# Patient Record
Sex: FEMALE | Race: WHITE
[De-identification: names, ages, dates, MRNs, and addresses within clinical notes are randomized per-mention and may not be internally consistent; named-entity substitution may affect disease eponyms.]

---

## 2020-07-06 ENCOUNTER — HOSPITAL ENCOUNTER (OUTPATIENT)
Dept: HOSPITAL 96 - M.ULTRA | Age: 60
End: 2020-07-06
Attending: SURGERY
Payer: COMMERCIAL

## 2020-07-06 DIAGNOSIS — R92.1: Primary | ICD-10-CM

## 2020-07-10 ENCOUNTER — HOSPITAL ENCOUNTER (OUTPATIENT)
Dept: HOSPITAL 96 - M.RAD | Age: 60
Discharge: HOME | End: 2020-07-10
Attending: SURGERY
Payer: COMMERCIAL

## 2020-07-10 DIAGNOSIS — R92.1: Primary | ICD-10-CM

## 2020-07-10 DIAGNOSIS — Z79.899: ICD-10-CM

## 2020-07-27 ENCOUNTER — HOSPITAL ENCOUNTER (OUTPATIENT)
Dept: HOSPITAL 96 - M.LAB | Age: 60
End: 2020-07-27
Attending: SURGERY
Payer: COMMERCIAL

## 2020-07-27 DIAGNOSIS — D05.01: Primary | ICD-10-CM

## 2020-07-27 DIAGNOSIS — D05.11: ICD-10-CM

## 2020-07-27 LAB
BUN SERPL-MCNC: 8 MG/DL (ref 7–18)
CREAT SERPL-MCNC: 0.9 MG/DL (ref 0.6–1.3)

## 2020-08-04 ENCOUNTER — HOSPITAL ENCOUNTER (OUTPATIENT)
Dept: HOSPITAL 96 - M.LAB | Age: 60
End: 2020-08-04
Attending: SURGERY
Payer: COMMERCIAL

## 2020-08-04 DIAGNOSIS — D05.11: ICD-10-CM

## 2020-08-04 DIAGNOSIS — D05.01: Primary | ICD-10-CM

## 2020-08-07 ENCOUNTER — HOSPITAL ENCOUNTER (OUTPATIENT)
Dept: HOSPITAL 96 - M.ORTHSURG | Age: 60
Setting detail: OBSERVATION
LOS: 1 days | Discharge: HOME HEALTH | End: 2020-08-08
Attending: SURGERY | Admitting: SURGERY
Payer: COMMERCIAL

## 2020-08-07 VITALS — SYSTOLIC BLOOD PRESSURE: 113 MMHG | DIASTOLIC BLOOD PRESSURE: 66 MMHG

## 2020-08-07 VITALS — SYSTOLIC BLOOD PRESSURE: 114 MMHG | DIASTOLIC BLOOD PRESSURE: 56 MMHG

## 2020-08-07 VITALS — SYSTOLIC BLOOD PRESSURE: 123 MMHG | DIASTOLIC BLOOD PRESSURE: 54 MMHG

## 2020-08-07 VITALS — HEIGHT: 65 IN | WEIGHT: 192 LBS | BODY MASS INDEX: 31.99 KG/M2

## 2020-08-07 DIAGNOSIS — Z79.899: ICD-10-CM

## 2020-08-07 DIAGNOSIS — F10.239: ICD-10-CM

## 2020-08-07 DIAGNOSIS — N39.0: ICD-10-CM

## 2020-08-07 DIAGNOSIS — D05.11: Primary | ICD-10-CM

## 2020-08-07 DIAGNOSIS — B96.20: ICD-10-CM

## 2020-08-07 DIAGNOSIS — I42.9: ICD-10-CM

## 2020-08-07 LAB
ANION GAP SERPL CALC-SCNC: 10 MMOL/L (ref 7–16)
BUN SERPL-MCNC: 10 MG/DL (ref 7–18)
CALCIUM SERPL-MCNC: 8.7 MG/DL (ref 8.5–10.1)
CHLORIDE SERPL-SCNC: 104 MMOL/L (ref 98–107)
CO2 SERPL-SCNC: 27 MMOL/L (ref 21–32)
CREAT SERPL-MCNC: 0.8 MG/DL (ref 0.6–1.3)
GLUCOSE SERPL-MCNC: 108 MG/DL (ref 70–99)
HCT VFR BLD CALC: 45.7 % (ref 37–47)
HGB BLD-MCNC: 15.9 GM/DL (ref 12–15)
MCH RBC QN AUTO: 33 PG (ref 26–34)
MCHC RBC AUTO-ENTMCNC: 34.7 G/DL (ref 28–37)
MCV RBC: 95.1 FL (ref 80–100)
MPV: 8.7 FL. (ref 7.2–11.1)
PLATELET COUNT*: 232 THOU/UL (ref 150–400)
POTASSIUM SERPL-SCNC: 3.8 MMOL/L (ref 3.5–5.1)
RBC # BLD AUTO: 4.81 MIL/UL (ref 4.2–5)
RDW-CV: 13.2 % (ref 10.5–14.5)
SODIUM SERPL-SCNC: 141 MMOL/L (ref 136–145)
WBC # BLD AUTO: 5.2 THOU/UL (ref 4–11)

## 2020-08-07 NOTE — EKG
[place] [address]
Phone:  [phone]                     ELECTROCARDIOGRAM REPORT      
_______________________________________________________________________________
 
Name:         ARIADNA WEBB              Room:          [address]
M.R.#:    [id number]     Account #:     [id number]  
Admission:    20    Attend Phys:   Nivia Melchor,
Discharge:                Date of Birth: [birth year]  
Date of Service: 20 0746  Report #:      [id number]
        [id number]
_______________________________________________________________________________
THIS REPORT FOR:  //name//                      
 
                          [facility]
                                       
Test Date:    2020               Test Time:    07:46:29
Pat Name:     ARIADNA WEBB           Department:   
Patient ID:   [id number]            Room:         [place] [address]
Gender:       F                        Technician:   
:          1960               Requested By: Nivia Melchor
Order Number: [id number]    Reading MD:   Avelino Hernandez
                                 Measurements
Intervals                              Axis          
Rate:         67                       P:            -8
MD:           128                      QRS:          34
QRSD:         97                       T:            9
QT:           432                                    
QTc:          456                                    
                           Interpretive Statements
Sinus rhythm
Abnormal R-wave progression, early transition
Abnormal T, consider ischemia, anterior leads
Compared to ECG 2015 01:25:29
No significant changes
Electronically Signed On 2020 9:29:53 CDT by Avelino Hernandez
[email or web address]
 
 
 
 
 
 
 
 
 
 
 
 
 
 
 
 
 
 
 
  <ELECTRONICALLY SIGNED>
                                           By: Avelino Hernandez MD, [facility]      
  20     0929
D: 2046   _____________________________________
T: 2046   Avelino Hernandez MD, [facility]        /EPI

## 2020-08-08 VITALS — DIASTOLIC BLOOD PRESSURE: 64 MMHG | SYSTOLIC BLOOD PRESSURE: 134 MMHG

## 2020-08-08 VITALS — SYSTOLIC BLOOD PRESSURE: 115 MMHG | DIASTOLIC BLOOD PRESSURE: 57 MMHG

## 2020-08-08 VITALS — DIASTOLIC BLOOD PRESSURE: 56 MMHG | SYSTOLIC BLOOD PRESSURE: 130 MMHG

## 2020-08-08 VITALS — SYSTOLIC BLOOD PRESSURE: 130 MMHG | DIASTOLIC BLOOD PRESSURE: 56 MMHG

## 2020-08-10 NOTE — OP
[address]  [address]                    OPERATIVE REPORT              
_______________________________________________________________________________
 
Name:       ARIADNA WEBB               Room:           [address] Fer LAZAR#:  [id number]      Account #:      [id number]  
Admission:  08/07/20     Attend Phys:    Nivia Melchor DO
Discharge:  08/08/20     Date of Birth:  [birth year]  
         Report #: [id number]
                                                                     [id number]  
_______________________________________________________________________________
THIS REPORT FOR:  //name//                      
 
cc:  Johan Dobson John E. DO                                                  ~
THIS REPORT FOR:  //name//                      
 
CC: Nivia Dobson DO
 
DICTATED BY: Aleja Ballard DO
 
DATE OF SERVICE:  08/07/2020
 
 
PREOPERATIVE DIAGNOSIS:  Right breast ductal carcinoma in situ.
 
POSTOPERATIVE DIAGNOSIS:  Right breast ductal carcinoma in situ.
 
PRIMARY SURGEON:  Nivia Melchor DO.
 
FIRST ASSISTANT:  Aleja Ballard DO, PGY3.
 
SECOND ASSISTANT:  GLADYS Carlos student.
 
OPERATION PERFORMED:  Right breast simple mastectomy with superficial sentinel
lymph node dissection.
 
ANESTHESIA:  General and local.
 
ESTIMATED BLOOD LOSS:  50 mL.
 
SPECIMEN:  Right breast and right sentinel lymph node.
 
COMPLICATIONS:  None.
 
FINDINGS:  We had uptake at the nipple of around 8560.  We did find right
sentinel lymph node that was blue and hot with uptake at 2630.
 
INDICATIONS FOR PROCEDURE:  The patient is a pleasant 60-year-old female that
presented to our office after a right breast MRI.  She had fairly large
extensive area of DCIS as well as an additional finding of LCIS on her biopsy. 
We discussed with her that with a lumpectomy, she would likely have fairly
significant deformity of her right breast, so it was recommended that she
undergo a right mastectomy or bilateral mastectomy.  The procedure, risks,
benefits, possible complications to include bleeding, infection, injury to
 
 
 
[place] [address]                    OPERATIVE REPORT              
_______________________________________________________________________________
 
Name:       SEAN WEBBNABIL KIRBY               Room:           [address] Fer LAZAR#:  [id number]      Account #:      [id number]  
Admission:  08/07/20     Attend Phys:    Nivia Melchor DO
Discharge:  08/08/20     Date of Birth:  [birth year]  
         Report #: [id number]
                                                                     [id number]  
_______________________________________________________________________________
surrounding structures, need for additional surgery, postoperative seroma or
hematoma, flap failure, risk of anesthesia, and other risks of surgery were
discussed with the patient in great detail.  She voiced complete understanding
and wished to proceed with a right simple mastectomy and sentinel lymph node
dissection.  The DCIS was high-grade with comedo type necrosis, which was why we
planned to proceed with a sentinel lymph node dissection.
 
DESCRIPTION OF PROCEDURE:  Informed consent was obtained.  The patient was taken
to the operating room and placed supine on the operating room table.  General
endotracheal anesthesia was induced without difficulty.  SCDs were placed on
bilateral lower extremities.  Preoperative antibiotics were given.  An initial
timeout was performed and Lymphazurin blue dye was injected in all 4 quadrants
around the nipple.  Once this was done, the right breast and axilla were then
prepped and draped in the standard sterile fashion.  Prior to proceeding to the
operating room, the patient had gone to Radiology and gotten a nuclear medicine
injection to aid in our sentinel lymph node dissection.  Once the right breast
was prepped and draped in the standard sterile fashion, marking pen was used to
plan out our incision and an elliptical incision was made around the nipple
using a 10 blade scalpel.  Incision was then carried down through the
subcutaneous tissues using electrocautery and assistant then grasped the
superior skin edge and elevated it, so that we could begin to create a superior
flap.  Electrocautery was used to dissect the breast tissue away from the skin
and subcutaneous tissue.  We continued in this fashion in the superior direction
until we reached the inferior edge of the clavicle.  We then carried our
dissection medially until we reached the sternum.  The assistant then grasped
the skin edge along the inferior aspect of our incision and elevated it, so that
we could create our inferior flap.  Electrocautery and blunt dissection were
used to dissect the breast tissue away from the skin and subcutaneous tissues. 
We continued in this fashion until we reached the inframammary fold all along
the inferior aspect.  We carried our dissection laterally to the latissimus
dorsi muscle.  Once the borders of our dissection were complete, we began
medially and dissected straight down to the pectoralis fascia.  The breast
tissue was grasped between 2 Allis clamps and elevated.  Electrocautery was then
used to excise all around the borders of our previously dissected breast tissue.
 We used electrocautery to dissect down to the level of the pectoralis fascia
around the entire specimen.  We then grasped the breast tissue medially with 2
Allis clamps and elevated it.  Electrocautery was then used to completely excise
the right breast, leaving the pectoralis fascia intact.  Once the breast was
completely removed, it was marked with a silk suture with short stitch superior
and long stitch lateral.  Surgical site was inspected to ensure hemostasis. 
Neoprobe was then brought back onto the field.  We had gotten uptake at the
nipple of 8560.  Neoprobe was used to direct the direction of our sentinel lymph
node dissection.  Hemostat was used to bluntly dissect through the clavipectoral
fascia.  The clavipectoral fascia was opened up further using electrocautery. 
Neoprobe was used along the way to help direct us.  Hemostat was used to bluntly
dissect in the direction of high uptake until we encountered a blue lymph node. 
 
 
 
[address]  [address]                    OPERATIVE REPORT              
_______________________________________________________________________________
 
Name:       ARIADNA WEBB MIRTA               Room:           [address] Fer LAZAR#:  [id number]      Account #:      [id number]  
Admission:  08/07/20     Attend Phys:    Nivia Melchor DO
Discharge:  08/08/20     Date of Birth:  [birth year]  
         Report #: [id number]
                                                                     [id number]  
_______________________________________________________________________________
Lymph node and surrounding tissues were grasped with an Allis clamp and
completely excised using a combination of blunt dissection and electrocautery. 
Neoprobe was again used to confirm that this was our sentinel lymph node.  We
got uptake of 2630 at this lymph node.  There were no additional sentinel nodes
noted in the axilla.  Redding lymph node was passed off for specimen.  We then
copiously irrigated our surgical site with sterile water.  We then placed a JUNI
drain into the wound.  JUNI drain was sutured in place using a 2-0 nylon suture. 
Surgiflo was injected into our surgical site for additional hemostasis.  At this
point, we were ready to begin our closure.  The subcutaneous tissues were
reapproximated using 3-0 Vicryl suture in a simple interrupted and inverted
fashion.  The skin edges were then reapproximated using 4-0 Monocryl suture in a
running subcuticular fashion.  Approximately 30 mL of 0.5% Marcaine were
injected for local anesthesia.  The skin was cleansed and dried.  Dermabond was
applied to the incision.  A drain sponge and Tegaderm were applied to our drain.
 Once the Dermabond was dry, the incision was then covered with 4 x 4s, fluffs
and a surgical bra was placed on to the patient.  The patient tolerated the
procedure very well.  She was allowed to awaken in the operating room, was
transferred to the PACU in stable condition with plans for admission and ongoing
care.
 
 
 
 
 
 
 
 
 
 
 
 
 
 
 
 
 
 
 
 
 
 
 
 
 
<ELECTRONICALLY SIGNED>
                                        By:  Nivia Melchor DO         
08/10/20     1128
D: 08/07/20 1224_______________________________________
T: 08/07/20 1305Charesh Melchor DO            /nt

## 2021-04-12 ENCOUNTER — HOSPITAL ENCOUNTER (OUTPATIENT)
Dept: HOSPITAL 96 - M.ERS | Age: 61
Setting detail: OBSERVATION
LOS: 1 days | Discharge: HOME | End: 2021-04-13
Attending: STUDENT IN AN ORGANIZED HEALTH CARE EDUCATION/TRAINING PROGRAM | Admitting: STUDENT IN AN ORGANIZED HEALTH CARE EDUCATION/TRAINING PROGRAM
Payer: COMMERCIAL

## 2021-04-12 VITALS — SYSTOLIC BLOOD PRESSURE: 129 MMHG | DIASTOLIC BLOOD PRESSURE: 64 MMHG

## 2021-04-12 VITALS — DIASTOLIC BLOOD PRESSURE: 70 MMHG | SYSTOLIC BLOOD PRESSURE: 125 MMHG

## 2021-04-12 VITALS — DIASTOLIC BLOOD PRESSURE: 75 MMHG | SYSTOLIC BLOOD PRESSURE: 135 MMHG

## 2021-04-12 VITALS — HEIGHT: 65 IN | BODY MASS INDEX: 31.65 KG/M2 | WEIGHT: 190 LBS

## 2021-04-12 DIAGNOSIS — R07.89: Primary | ICD-10-CM

## 2021-04-12 DIAGNOSIS — Z85.3: ICD-10-CM

## 2021-04-12 DIAGNOSIS — Z79.899: ICD-10-CM

## 2021-04-12 DIAGNOSIS — E78.5: ICD-10-CM

## 2021-04-12 DIAGNOSIS — R00.0: ICD-10-CM

## 2021-04-12 DIAGNOSIS — I10: ICD-10-CM

## 2021-04-12 DIAGNOSIS — Z20.822: ICD-10-CM

## 2021-04-12 DIAGNOSIS — F10.20: ICD-10-CM

## 2021-04-12 DIAGNOSIS — F32.9: ICD-10-CM

## 2021-04-12 DIAGNOSIS — Z87.891: ICD-10-CM

## 2021-04-12 LAB
ABSOLUTE BASOPHILS: 0 THOU/UL (ref 0–0.2)
ABSOLUTE EOSINOPHILS: 0.1 THOU/UL (ref 0–0.7)
ABSOLUTE MONOCYTES: 0.5 THOU/UL (ref 0–1.2)
ALBUMIN SERPL-MCNC: 3.8 G/DL (ref 3.4–5)
ALP SERPL-CCNC: 53 U/L (ref 46–116)
ALT SERPL-CCNC: 44 U/L (ref 30–65)
ANION GAP SERPL CALC-SCNC: 7 MMOL/L (ref 7–16)
AST SERPL-CCNC: 26 U/L (ref 15–37)
BASOPHILS NFR BLD AUTO: 0.2 %
BILIRUB SERPL-MCNC: 0.5 MG/DL
BUN SERPL-MCNC: 10 MG/DL (ref 7–18)
CALCIUM SERPL-MCNC: 8.7 MG/DL (ref 8.5–10.1)
CHLORIDE SERPL-SCNC: 107 MMOL/L (ref 98–107)
CO2 SERPL-SCNC: 28 MMOL/L (ref 21–32)
CREAT SERPL-MCNC: 0.9 MG/DL (ref 0.6–1.3)
EOSINOPHIL NFR BLD: 1.9 %
GLUCOSE SERPL-MCNC: 115 MG/DL (ref 70–99)
GRANULOCYTES NFR BLD MANUAL: 60.9 %
HCT VFR BLD CALC: 43.4 % (ref 37–47)
HGB BLD-MCNC: 14.7 GM/DL (ref 12–15)
LIPASE: 177 U/L (ref 73–393)
LYMPHOCYTES # BLD: 1.1 THOU/UL (ref 0.8–5.3)
LYMPHOCYTES NFR BLD AUTO: 26.2 %
MAGNESIUM SERPL-MCNC: 2.1 MG/DL (ref 1.8–2.4)
MCH RBC QN AUTO: 32.1 PG (ref 26–34)
MCHC RBC AUTO-ENTMCNC: 33.8 G/DL (ref 28–37)
MCV RBC: 94.9 FL (ref 80–100)
MONOCYTES NFR BLD: 10.8 %
MPV: 8.9 FL. (ref 7.2–11.1)
NEUTROPHILS # BLD: 2.6 THOU/UL (ref 1.6–8.1)
NUCLEATED RBCS: 0 /100WBC
PLATELET COUNT*: 188 THOU/UL (ref 150–400)
POTASSIUM SERPL-SCNC: 4.2 MMOL/L (ref 3.5–5.1)
PROT SERPL-MCNC: 7.6 G/DL (ref 6.4–8.2)
RBC # BLD AUTO: 4.57 MIL/UL (ref 4.2–5)
RDW-CV: 12.6 % (ref 10.5–14.5)
SODIUM SERPL-SCNC: 142 MMOL/L (ref 136–145)
WBC # BLD AUTO: 4.3 THOU/UL (ref 4–11)

## 2021-04-12 NOTE — EKG
[place] [address]
Phone:  [phone]                     ELECTROCARDIOGRAM REPORT      
_______________________________________________________________________________
 
Name:         ARIADNA WEBB              Room:          [address]
M.R.#:    [id number]     Account #:     [id number]  
Admission:    21    Attend Phys:   Dorian To
Discharge:                Date of Birth: [birth year]  
Date of Service: 21 1328  Report #:      [id number]
        [id number]
_______________________________________________________________________________
THIS REPORT FOR:  //name//                      
 
                         [facility]
                                       
Test Date:    2021               Test Time:    13:28:43
Pat Name:     ARIADNA WEBB           Department:   
Patient ID:   [id number]            Room:         [facility]
Gender:       F                        Technician:   OLEG
:          1960               Requested By: Aaron Glass
Order Number: [id number] MD:   Avelino Hernandez
                                 Measurements
Intervals                              Axis          
Rate:         68                       P:            -26
CO:           122                      QRS:          34
QRSD:         92                       T:            -3
QT:           424                                    
QTc:          451                                    
                           Interpretive Statements
Sinus rhythm
 
RSR' in V1 or V2, right VCD or RVH
Nonspecific T abnormalities, anterior leads
Compared to ECG 2020 07:46:29
 
 
RSR' in V1 or V2 now present
 
T-wave abnormality still present
Electronically Signed On 2021 17:16:50 CDT by Avelino Hernandez
[email or web address]
 
 
 
 
 
 
 
 
 
 
 
 
 
 
  <ELECTRONICALLY SIGNED>
                                           By: Avelino Hernandez MD, [facility]      
  21     1716
D: 218   _____________________________________
T: 21 1328   Avelino Hernandez MD, [facility]        /EPI

## 2021-04-12 NOTE — NUR
Pt arrived to floor around 1730 from ED.  Pt A&O x4 and pleasant with staff.
Pt denies any pain. Bed in low position, call light within reach.

## 2021-04-13 VITALS — SYSTOLIC BLOOD PRESSURE: 120 MMHG | DIASTOLIC BLOOD PRESSURE: 62 MMHG

## 2021-04-13 VITALS — SYSTOLIC BLOOD PRESSURE: 112 MMHG | DIASTOLIC BLOOD PRESSURE: 54 MMHG

## 2021-04-13 VITALS — DIASTOLIC BLOOD PRESSURE: 60 MMHG | SYSTOLIC BLOOD PRESSURE: 132 MMHG

## 2021-04-13 LAB
CHOLEST SERPL-MCNC: 239 MG/DL (ref ?–200)
HDLC SERPL-MCNC: 36 MG/DL (ref 40–?)
LDLC SERPL-MCNC: 169 MG/DL (ref ?–100)
TC:HDL: 6.6 RATIO
TRIGL SERPL-MCNC: 174 MG/DL (ref ?–150)
VLDLC SERPL CALC-MCNC: 35 MG/DL (ref ?–40)

## 2021-04-13 NOTE — NUR
PT ADMITTED D/T CHEST PAINS. PT LIVES HOME WITH HER SPOUSE. PT IS ACTIVE,
EMPLOYEED AND INDEPENDENT W/CARES. PT USE HH IN 8/2020 BUT STATED SHE DOES NOT
RECALL [employer]. PT HAS NO HX WITH SNF. PT HAS 0 DMES. THERE ARE NO
ANTICIPATED CM NEEDS AT THIS TIME.

## 2021-04-13 NOTE — EKG
[place] [address]
Phone:  [phone]                     ELECTROCARDIOGRAM REPORT      
_______________________________________________________________________________
 
Name:         ARIADNA WEBB              Room:          [address]
M.R.#:    [id number]     Account #:     [id number]  
Admission:    21    Attend Phys:   Dorian To
Discharge:                Date of Birth: [birth year]  
Date of Service: 21 0817  Report #:      [id number]
        [id number]
_______________________________________________________________________________
THIS REPORT FOR:  //name//                      
 
                          [facility]
                                       
Test Date:    2021               Test Time:    08:17:34
Pat Name:     ARIADNA WEBB           Department:   
Patient ID:   [id number]            Room:         [address]
Gender:       F                        Technician:   DEEP
:          1960               Requested By: Dorian To
Order Number: [id number]    Reading MD:   Avelino Hernandez
                                 Measurements
Intervals                              Axis          
Rate:         65                       P:            -24
FL:           121                      QRS:          28
QRSD:         94                       T:            22
QT:           454                                    
QTc:          473                                    
                           Interpretive Statements
Sinus rhythm
Abnormal R-wave progression, early transition
Nonspecific T abnormalities, anterior leads
Compared to ECG 2021 13:28:43
 
T-wave abnormality still present
Electronically Signed On 2021 9:40:43 CDT by Avelino Hernandez
[email or web address]
 
 
 
 
 
 
 
 
 
 
 
 
 
 
 
 
 
 
  <ELECTRONICALLY SIGNED>
                                           By: Avelino Hernandez MD, [facility]      
  21     0940
D: 21   _____________________________________
T: 21   Avelino Hernandez MD, [facility]        /EPI

## 2021-05-06 ENCOUNTER — HOSPITAL ENCOUNTER (OUTPATIENT)
Dept: HOSPITAL 96 - M.NUC | Age: 61
End: 2021-05-06
Attending: NURSE PRACTITIONER
Payer: COMMERCIAL

## 2021-05-06 DIAGNOSIS — Z87.891: ICD-10-CM

## 2021-05-06 DIAGNOSIS — R07.9: Primary | ICD-10-CM

## 2021-05-06 NOTE — CARDNUC
[place] [address]
Phone:  [phone]                     CARDIAC NUCLEAR IMAGING REPORT
_______________________________________________________________________________
 
Name:         ARIADNA WEBB              Room:                     [facility]
M.R.#:    [id number]     Account #:     [id number]  
Admission:    05/06/21    Attend Phys:   Bryanna Bar RN
Discharge:                Date of Birth: [birth year]  
Date of Service: 05/06/21 1649  Report #:      [id number]
        [id number] 
_______________________________________________________________________________
THIS REPORT FOR:
 
cc:  Johan Dobson John E. DO Liston, Michael J. MD [facility]     
                                                                       ~
 
--------------- APPROVED REPORT --------------
 
 
Imaging Protocol: Rest Tc-99m/Stress Tc-99m 1 day
Study performed:  05/06/2021 12:30:00
 
Indication: Chest pain
Patient Location: Out-Patient
Stress Tech: Lindsay Nieves
Stress Nurse: Kristel Ying RN
NM Tech:CHEO Mark
 
Ht: 5 ft 5 in  Wt: 185 lbs  BSA:  1.91 m2
    BMI:  30.78
 
Medical History
Medical History: Smoking
Medications: atenolol, zetia, ntg, asa-81, chlorthalidone
Allergies: No known drug allergies
Cardiac Risk Factors: Age, Smoking
Exercise History: Physically active
Meds Held (24 hrs): atenolol
 
Resting Data
Rest SPECT myocardial perfusion imaging was performed in supine 
position 30 minutes following the intravenous injection of 11.7 mCi 
of Tc-99m Sestamibi.
Time of rest injection: 1255     Date: 05/06/2021
The images were gated to evaluate regional wall motion and calculate 
left ventricular ejection fraction. 
Administration Route: IV
Administration Site: Left Arm
 
Exercise Stress
At peak stress, the patient was injected intravenously with 32.0mCi 
of Tc-99m Sestamibi.
Time of stress injection: 1425     Date: 05/06/2021
Administration Route: IV
Administration Site: Left Arm
 
 
[place] [address]
Phone:  [phone]                     CARDIAC NUCLEAR IMAGING REPORT
_______________________________________________________________________________
 
Name:         ARIADNA WEBB              Room:                     [facility]
M.R.#:    [id number]     Account #:     [id number]  
Admission:    05/06/21    Attend Phys:   Bryanna Bar RN
Discharge:                Date of Birth: [birth year]  
Date of Service: 05/06/21 1649  Report #:      [id number]
        [id number] 
_______________________________________________________________________________
Gated Stress SPECT was performed 30 minutes after stress 
injection.
The images were gated to evaluate regional wall motion and calculate 
left ventricular ejection fraction. 
Prone imaging was performed.
 
Stress Test Details
Stress Test:  Exercise stress testing was performed using a Elliott 
protocol.      
 
HR      Max Heart Rate (APMHR): 160 bpm  
Resting HR:            76 bpm   Target HR (85% APMHR): 136 bpm  
Max HR Achieved:  156 bpm        
% of APMHR:         97         
Recovery HR:            101 bpm        
HR response to stress: Accelerated HR response to stress      
 
BP           
Resting BP:  134/73 mmHg        
Max BP:       199/76 mmHg        
Recovery BP:       160/74 mmHg        
ECG           
Resting ECG:  Sinus Rhythm, nonspecific ST-T 
abnormalities      
Stress ECG:     Sinus tachycardia, nonspecific ST-T 
abnormalities      
ST Change: None          
Arrhythmia:    None         
Recovery ECG: Sinus Rhythm, nonspecific ST-T 
abnormalities      
Recovery ST Change: None        
Recovery Arrhythmia: None        
 
Clinical
Reason for Termination: Fatigue, Dyspnea
Exercise duration: 5 min 1 sec
Exercise capacity: 7.05 METs
Overall Exercise Capacity for Age: Reduced
Functional Aerobic Impairment  93%
The patient tolerated standard Elliott protocol exercise. Patient 
exhibited limited exercise tolerance.
 
Stress ECG Conclusion
The baseline twelve-lead EKG shows sinus rhythm with nonspecific ST 
segment depression T wave inversion.  EKGs obtained during and post 
exercise shows sinus rhythm and sinus tachycardia with nonspecific ST 
 
 
[place] [address]
Phone:  [phone]                     CARDIAC NUCLEAR IMAGING REPORT
_______________________________________________________________________________
 
Name:         ARIADNA WEBB              Room:                     [facility]
M.R.#:    [id number]     Account #:     [id number]  
Admission:    05/06/21    Attend Phys:   Bryanna Bar RN
Discharge:                Date of Birth: [birth year]  
Date of Service: 05/06/21 1649  Report #:      [id number]
        [id number] 
_______________________________________________________________________________
segment depression and T wave inversion.  There were no 
stress-induced arrhythmias.
 
Study Quality
Study: Good
Artifact: No artifact 
 
Study Data
At rest, the left ventricular ejection fraction was 76%..   
Post stress, the left ventricular ejection was 72%..   
TID = 0.90.       
 
Perfusion
Perfusion images obtained at rest and post exercise show uniform 
uptake of the radioisotope throughout the myocardium.  There were no 
defects to suggest infarct or ischemia.
 
Wall Motion
Normal left ventricular wall motion.
 
Nuclear Conclusion
ECG Findings: non-diagnostic 
Clinical Findings: negative for ischemia 
Nuclear Findings: negative for ischemia 
Exercise Capacity: Limited
Left Ventricular Function: normal 
Risk Study: low
Perfusion images show no defect to suggest infarct or ischemia.  Left 
ventricular systolic function appears normal on gated studies.  This 
is a low risk study. 
 
<Conclusion>
The baseline twelve-lead EKG shows sinus rhythm with nonspecific ST 
segment depression T wave inversion.  EKGs obtained during and post 
exercise shows sinus rhythm and sinus tachycardia with nonspecific ST 
segment depression and T wave inversion.  There were no 
stress-induced arrhythmias.
 
 
 
 
 
 
 
  <ELECTRONICALLY SIGNED>
                                           By: Curt Juarez MD, FACC   
  05/06/21     1649
D: 05/06/21 1649   _____________________________________
T: 05/06/21 1649   Curt Juarez MD, FACC     /INF

## 2021-07-06 ENCOUNTER — HOSPITAL ENCOUNTER (OUTPATIENT)
Dept: HOSPITAL 96 - M.RAD | Age: 61
End: 2021-07-06
Attending: INTERNAL MEDICINE
Payer: COMMERCIAL

## 2021-07-06 DIAGNOSIS — Z85.3: ICD-10-CM

## 2021-07-06 DIAGNOSIS — Z90.11: ICD-10-CM

## 2021-07-06 DIAGNOSIS — Z17.0: ICD-10-CM

## 2021-07-06 DIAGNOSIS — R92.8: Primary | ICD-10-CM

## 2024-04-25 ENCOUNTER — APPOINTMENT (RX ONLY)
Dept: URBAN - METROPOLITAN AREA CLINIC 142 | Facility: CLINIC | Age: 64
Setting detail: DERMATOLOGY
End: 2024-04-25

## 2024-04-25 DIAGNOSIS — B35.4 TINEA CORPORIS: ICD-10-CM

## 2024-04-25 PROCEDURE — ? KOH PREP

## 2024-04-25 PROCEDURE — 99203 OFFICE O/P NEW LOW 30 MIN: CPT

## 2024-04-25 PROCEDURE — ? COUNSELING

## 2024-04-25 PROCEDURE — ? PRESCRIPTION

## 2024-04-25 RX ORDER — ECONAZOLE NITRATE 10 MG/G
CREAM TOPICAL BID
Qty: 85 | Refills: 0 | Status: ERX | COMMUNITY
Start: 2024-04-25

## 2024-04-25 RX ADMIN — ECONAZOLE NITRATE: 10 CREAM TOPICAL at 00:00

## 2024-04-25 ASSESSMENT — LOCATION SIMPLE DESCRIPTION DERM
LOCATION SIMPLE: LEFT THIGH
LOCATION SIMPLE: GROIN

## 2024-04-25 ASSESSMENT — LOCATION DETAILED DESCRIPTION DERM
LOCATION DETAILED: LEFT ANTERIOR PROXIMAL THIGH
LOCATION DETAILED: RIGHT INGUINAL CREASE
LOCATION DETAILED: RIGHT SUPRAPUBIC SKIN

## 2024-04-25 ASSESSMENT — LOCATION ZONE DERM
LOCATION ZONE: TRUNK
LOCATION ZONE: LEG

## 2024-05-31 ENCOUNTER — APPOINTMENT (RX ONLY)
Dept: URBAN - METROPOLITAN AREA CLINIC 142 | Facility: CLINIC | Age: 64
Setting detail: DERMATOLOGY
End: 2024-05-31

## 2024-05-31 DIAGNOSIS — B35.4 TINEA CORPORIS: ICD-10-CM

## 2024-05-31 PROCEDURE — 99213 OFFICE O/P EST LOW 20 MIN: CPT

## 2024-05-31 PROCEDURE — ? TREATMENT REGIMEN

## 2024-05-31 PROCEDURE — ? COUNSELING

## 2024-05-31 ASSESSMENT — LOCATION ZONE DERM: LOCATION ZONE: LEG

## 2024-05-31 ASSESSMENT — LOCATION DETAILED DESCRIPTION DERM: LOCATION DETAILED: LEFT PROXIMAL POSTERIOR THIGH

## 2024-05-31 ASSESSMENT — LOCATION SIMPLE DESCRIPTION DERM: LOCATION SIMPLE: LEFT POSTERIOR THIGH
